# Patient Record
(demographics unavailable — no encounter records)

---

## 2019-10-21 NOTE — TELEPHONE ENCOUNTER
----- Message from Caren Jackson sent at 10/18/2019 12:28 PM CDT -----  Contact: Yess[facility] home health nurse   Home Health nurse call in to get a refill on pt medication. Tamsulosin 0.4mg RX number [id number] pt use [facility] [address] [place] [address] phone number [phone]. Please call home health nurse back to confirm rx was sent over to pharmacy [phone]

## 2019-10-25 RX ORDER — TAMSULOSIN HYDROCHLORIDE 0.4 MG/1
0.4 CAPSULE ORAL DAILY
Qty: 90 CAPSULE | Refills: 1 | Status: SHIPPED | OUTPATIENT
Start: 2019-10-25 | End: 2020-10-24